# Patient Record
Sex: FEMALE | Race: AMERICAN INDIAN OR ALASKA NATIVE
[De-identification: names, ages, dates, MRNs, and addresses within clinical notes are randomized per-mention and may not be internally consistent; named-entity substitution may affect disease eponyms.]

---

## 2018-03-06 ENCOUNTER — HOSPITAL ENCOUNTER (EMERGENCY)
Dept: HOSPITAL 5 - ED | Age: 28
Discharge: HOME | End: 2018-03-06
Payer: COMMERCIAL

## 2018-03-06 VITALS — SYSTOLIC BLOOD PRESSURE: 142 MMHG | DIASTOLIC BLOOD PRESSURE: 86 MMHG

## 2018-03-06 DIAGNOSIS — K08.89: ICD-10-CM

## 2018-03-06 DIAGNOSIS — K04.7: Primary | ICD-10-CM

## 2018-03-06 PROCEDURE — 99282 EMERGENCY DEPT VISIT SF MDM: CPT

## 2018-03-06 NOTE — EMERGENCY DEPARTMENT REPORT
HPI





- General


Chief Complaint: Dental/Oral


Time Seen by Provider: 03/06/18 12:36





- HPI


HPI: 


The patient is a 27 year-old female who presents to ED complaining  of 6/10 

pain in the left side of his mouth x 2 days .  Patient states that the pain 

started 2 days ago and has increased in severity today.  The pain is 

exacerbated by eating and opening of the mouth.


Patient states the pain is alleviated initially with pain medication but comes 

back.


Patient states that it radiates towards  ear.  Patient describes a as a 

throbbing, pressure-like sensation.  Patient states otherwise well and has no 

other complaints.


Patient has had no fevers and no chills. No chest pain, no shortness of breath. 

No abdominal pain. No  shortness of breath or  recent trauma to the face. 








ED Past Medical Hx





- Past Medical History


Previous Medical History?: No





- Surgical History


Past Surgical History?: No





- Social History


Smoking Status: Current Some Day Smoker


Substance Use Type: None





- Medications


Home Medications: 


 Home Medications











 Medication  Instructions  Recorded  Confirmed  Last Taken  Type


 


Acetaminophen [Tylenol] 1,000 mg PO Q6HR #30 tablet 09/17/16  Unknown Rx


 


Acetaminophen/Codeine [Tylenol 1 tab PO Q6H #10 tablet 03/06/18  Unknown Rx





/Codeine # 3 tab]     


 


Amoxicillin/K Clav Tab [Augmentin 1 tab PO Q12HR #10 tab 03/06/18  Unknown Rx





875MG TAB]     


 


Ibuprofen [Motrin] 600 mg PO Q8H PRN #30 tablet 03/06/18  Unknown Rx














ED Review of Systems


ROS: 


Stated complaint: TOOTHACHE


Other details as noted in HPI





Constitutional: denies: chills, fever


Eyes: denies: eye pain, eye discharge, vision change


ENT: dental pain.  denies: ear pain, throat pain


Respiratory: denies: cough, shortness of breath, wheezing


Cardiovascular: denies: chest pain, palpitations


Endocrine: no symptoms reported


Gastrointestinal: denies: abdominal pain, nausea, diarrhea


Genitourinary: denies: urgency, dysuria, discharge


Musculoskeletal: denies: back pain, joint swelling, arthralgia


Skin: denies: rash, lesions


Neurological: denies: headache, weakness, numbness, paresthesias, confusion


Psychiatric: denies: anxiety, depression


Hematological/Lymphatic: denies: easy bleeding, easy bruising





Physical Exam





- Physical Exam


Vital Signs: 


 Vital Signs











  03/06/18





  10:47


 


Temperature 99.5 F


 


Pulse Rate 80


 


Respiratory 18





Rate 


 


Blood Pressure 142/86


 


O2 Sat by Pulse 99





Oximetry 











Physical Exam: 


GENERAL: Alert and oriented x3, no apparent distress, Normal Gait, atraumatic.


HEAD: Head is normocephalic and a-traumatic.





EARS: symetrical, atraumatic, non tender, ear canal clear and moderate cerumen, 

tympanic membrance non inflamed. gross auditory nml bilaterally. 





MOUTH:Mouth is well hydrated and without lesions. Tonsils nonerythematous or 

swollen,  Uvula midline, Tongue not elevated. Mucous membranes are moist. 

Posterior pharynx clear, no exudate or lesions. Patent airways.  Tooth #11 

minutes then, gingiva tender to palpation on tooth #11, non-edema, no pus 

drainage, no erythema.


NECK: Supple. Non edematous,  No lymphadenopathy or thyromegaly.  No C-spine 

tenderness


LUNGS: Symetrical with respiration, CTAB.


HEART:  S1, S2 present, regular rate and rhythm without murmur, 





NEUROLOGIC:  The patient is cooperative with no focal neurologic deficits


SKIN:  Warm and dry, No lesions, No ulceration or induration present.














ED Course


 Vital Signs











  03/06/18





  10:47


 


Temperature 99.5 F


 


Pulse Rate 80


 


Respiratory 18





Rate 


 


Blood Pressure 142/86


 


O2 Sat by Pulse 99





Oximetry 














ED Medical Decision Making





- Radiology Data


Radiology results: report reviewed





- Medical Decision Making


27-year-old female who presents with left-sided Facial pain secondary to 

odontogenic caries


ED course: Patient received 875 mg of augmentin,  1 tablets of Tylenol No. 3. 


 Odontogenic infection versus ear infection. Based upon  history and physical 

examination,  pain is a result of an infection of tooth number 11 and that the 

pain. Pt feels on the right side of his face and towards the ear is referred 

pain from this infectious process.  


Pt has no evidence of acute impending airway compromise.


At this point, patient will be discharged home on some antibiotics and pain 

trial,  she will do well with an outpatient course of antibiotics. 


Follow up with the Dental Clinic as referred


Vital signs are normal patient is in no acute distress.


Pt had an effect uneventful ED stay





Critical care attestation.: 


If time is entered above; I have spent that time in minutes in the direct care 

of this critically ill patient, excluding procedure time.








ED Disposition


Clinical Impression: 


 Pain, dental, Tooth infection





Disposition: DC-01 TO HOME OR SELFCARE


Is pt being admited?: No


Does the pt Need Aspirin: No


Condition: Stable


Instructions:  Toothache (ED), Dental Caries (ED)


Additional Instructions: 


Make sure to follow up with the dentist as discussed.


Take all your medications as you've been prescribed.


If you have any worsening symptoms or develop new symptoms please return to ED 

immediately.


Prescriptions: 


Acetaminophen/Codeine [Tylenol /Codeine # 3 tab] 1 tab PO Q6H #10 tablet


Amoxicillin/K Clav Tab [Augmentin 875MG TAB] 1 tab PO Q12HR #10 tab


Ibuprofen [Motrin] 600 mg PO Q8H PRN #30 tablet


 PRN Reason: Pain


Referrals: 


PRIMARY CARE,MD [Primary Care Provider] - 3-5 Days


[facility] [Outside] - 3-5 Days


[facility] [Outside] - 3-5 Days


Forms:  Accompanied Note, Work/School Release Form(ED)


Time of Disposition: 12:59